# Patient Record
Sex: FEMALE | Race: BLACK OR AFRICAN AMERICAN | NOT HISPANIC OR LATINO | Employment: FULL TIME | URBAN - METROPOLITAN AREA
[De-identification: names, ages, dates, MRNs, and addresses within clinical notes are randomized per-mention and may not be internally consistent; named-entity substitution may affect disease eponyms.]

---

## 2018-07-27 ENCOUNTER — OFFICE VISIT (OUTPATIENT)
Dept: CARDIOLOGY | Facility: CLINIC | Age: 33
End: 2018-07-27
Payer: COMMERCIAL

## 2018-07-27 VITALS
SYSTOLIC BLOOD PRESSURE: 120 MMHG | WEIGHT: 283.06 LBS | HEART RATE: 77 BPM | HEIGHT: 67 IN | BODY MASS INDEX: 44.43 KG/M2 | DIASTOLIC BLOOD PRESSURE: 72 MMHG

## 2018-07-27 DIAGNOSIS — I10 HYPERTENSION, UNSPECIFIED TYPE: ICD-10-CM

## 2018-07-27 DIAGNOSIS — I87.2 VENOUS INSUFFICIENCY: ICD-10-CM

## 2018-07-27 DIAGNOSIS — I10 ESSENTIAL HYPERTENSION: ICD-10-CM

## 2018-07-27 PROCEDURE — 99203 OFFICE O/P NEW LOW 30 MIN: CPT | Mod: S$GLB,,, | Performed by: INTERNAL MEDICINE

## 2018-07-27 PROCEDURE — 99999 PR PBB SHADOW E&M-NEW PATIENT-LVL III: CPT | Mod: PBBFAC,,, | Performed by: INTERNAL MEDICINE

## 2018-07-27 PROCEDURE — 93000 ELECTROCARDIOGRAM COMPLETE: CPT | Mod: S$GLB,,, | Performed by: INTERNAL MEDICINE

## 2018-07-27 RX ORDER — NIFEDIPINE 60 MG/1
60 TABLET, EXTENDED RELEASE ORAL EVERY 12 HOURS
COMMUNITY
Start: 2018-06-20 | End: 2019-06-05

## 2018-07-27 RX ORDER — LABETALOL 200 MG/1
200 TABLET, FILM COATED ORAL 2 TIMES DAILY
COMMUNITY
Start: 2018-06-10 | End: 2024-01-11

## 2018-07-27 NOTE — PROGRESS NOTES
Subjective:   Patient ID:  Briseyda Juan  is a 33 y.o. female who presents for evaluation of Establish Care (echo results)      32 yo female. Referred for pulm edema.  PMH HTN.  Had first pregenency and delivery on 05-15  Developed pulm. Edema during the delivery. Had lasix and removed 20 pounds. Resolved  Now, mild CONTRERAS.  C/o lightheadedness and dizziness if standing up fast. No syncope. But faint last week. Occasional palpitation.  No orthopnea, PND, and chest pain.  C/o feet edema after delivery. Venous US ta BRG r/o DVT. Suggest venosu insuiffciciney  ECHO doen in  at [facility] normal EF, LVH  No smoking/drinking.  No f/h premature CAD.  Labetalol for a yr and Nifedipine started on 05/22.        Past Medical History:   Diagnosis Date    Hypertension        History reviewed. No pertinent surgical history.    Social History   Substance Use Topics    Smoking status: Never Smoker    Smokeless tobacco: Not on file    Alcohol use No       Family History   Problem Relation Age of Onset    Hypertension Mother        Review of Systems   Constitution: Negative for decreased appetite, diaphoresis, fever, weakness, malaise/fatigue and night sweats.   HENT: Negative for nosebleeds.    Eyes: Negative for blurred vision and double vision.   Cardiovascular: Negative for chest pain, claudication, dyspnea on exertion, irregular heartbeat, leg swelling, near-syncope, orthopnea, palpitations, paroxysmal nocturnal dyspnea and syncope.   Respiratory: Negative for cough, shortness of breath, sleep disturbances due to breathing, snoring, sputum production and wheezing.    Endocrine: Negative for cold intolerance and polyuria.   Hematologic/Lymphatic: Does not bruise/bleed easily.   Skin: Negative for rash.   Musculoskeletal: Negative for back pain, falls, joint pain, joint swelling and neck pain.   Gastrointestinal: Negative for abdominal pain, heartburn, nausea and vomiting.   Genitourinary: Negative for dysuria, frequency and  hematuria.   Neurological: Negative for difficulty with concentration, dizziness, focal weakness, headaches, light-headedness, numbness and seizures.   Psychiatric/Behavioral: Negative for depression, memory loss and substance abuse. The patient does not have insomnia.    Allergic/Immunologic: Negative for HIV exposure and hives.       Objective:   Physical Exam   Constitutional: She is oriented to person, place, and time. She appears well-nourished.   HENT:   Head: Normocephalic.   Eyes: Pupils are equal, round, and reactive to light.   Neck: Normal carotid pulses and no JVD present. Carotid bruit is not present. No thyromegaly present.   Cardiovascular: Normal rate, regular rhythm, normal heart sounds and normal pulses.   No extrasystoles are present. PMI is not displaced.  Exam reveals no gallop and no S3.    No murmur heard.  Pulmonary/Chest: Breath sounds normal. No stridor. No respiratory distress.   Abdominal: Soft. Bowel sounds are normal. There is no tenderness. There is no rebound.   Musculoskeletal: Normal range of motion. She exhibits edema.   1+   Neurological: She is alert and oriented to person, place, and time.   Skin: Skin is intact. No rash noted.   Psychiatric: Her behavior is normal.       No results found for: CHOL  No results found for: HDL  No results found for: LDLCALC  No results found for: TRIG  No results found for: CHOLHDL    Chemistry    No results found for: NA, K, CL, CO2, BUN, CREATININE, GLU No results found for: CALCIUM, ALKPHOS, AST, ALT, BILITOT, ESTGFRAFRICA, EGFRNONAA       No results found for: LABA1C, HGBA1C  No results found for: TSH  No results found for: INR, PROTIME  No results found for: WBC, HGB, HCT, MCV, PLT  BNP  @LABRCNTIP(BNP,BNPTRIAGEBLO)@  CrCl cannot be calculated (No order found.).  No results found in the last 24 hours.  No results found in the last 24 hours.  No results found in the last 24 hours.    Assessment:      1. Essential hypertension    2. Venous  insufficiency    3. HTN (hypertension)        Plan:   Can not stop Nifedipine due to breast feeding  Advise compression hose.  Dash  Continue Nifedipine and Labetalol  F/u with PCP

## 2019-06-05 ENCOUNTER — OFFICE VISIT (OUTPATIENT)
Dept: OTOLARYNGOLOGY | Facility: CLINIC | Age: 34
End: 2019-06-05
Payer: COMMERCIAL

## 2019-06-05 VITALS
WEIGHT: 291.88 LBS | TEMPERATURE: 98 F | SYSTOLIC BLOOD PRESSURE: 186 MMHG | BODY MASS INDEX: 45.72 KG/M2 | HEART RATE: 93 BPM | DIASTOLIC BLOOD PRESSURE: 121 MMHG

## 2019-06-05 DIAGNOSIS — R44.2 PHANTOSMIA: ICD-10-CM

## 2019-06-05 DIAGNOSIS — J32.9 CHRONIC SINUSITIS, UNSPECIFIED LOCATION: Primary | ICD-10-CM

## 2019-06-05 PROCEDURE — 3080F DIAST BP >= 90 MM HG: CPT | Mod: CPTII,S$GLB,, | Performed by: ORTHOPAEDIC SURGERY

## 2019-06-05 PROCEDURE — 3080F PR MOST RECENT DIASTOLIC BLOOD PRESSURE >= 90 MM HG: ICD-10-PCS | Mod: CPTII,S$GLB,, | Performed by: ORTHOPAEDIC SURGERY

## 2019-06-05 PROCEDURE — 99999 PR PBB SHADOW E&M-EST. PATIENT-LVL III: CPT | Mod: PBBFAC,,, | Performed by: ORTHOPAEDIC SURGERY

## 2019-06-05 PROCEDURE — 99204 OFFICE O/P NEW MOD 45 MIN: CPT | Mod: 25,S$GLB,, | Performed by: ORTHOPAEDIC SURGERY

## 2019-06-05 PROCEDURE — 3008F PR BODY MASS INDEX (BMI) DOCUMENTED: ICD-10-PCS | Mod: CPTII,S$GLB,, | Performed by: ORTHOPAEDIC SURGERY

## 2019-06-05 PROCEDURE — 99999 PR PBB SHADOW E&M-EST. PATIENT-LVL III: ICD-10-PCS | Mod: PBBFAC,,, | Performed by: ORTHOPAEDIC SURGERY

## 2019-06-05 PROCEDURE — 3077F SYST BP >= 140 MM HG: CPT | Mod: CPTII,S$GLB,, | Performed by: ORTHOPAEDIC SURGERY

## 2019-06-05 PROCEDURE — 3077F PR MOST RECENT SYSTOLIC BLOOD PRESSURE >= 140 MM HG: ICD-10-PCS | Mod: CPTII,S$GLB,, | Performed by: ORTHOPAEDIC SURGERY

## 2019-06-05 PROCEDURE — 31231 NASAL ENDOSCOPY DX: CPT | Mod: S$GLB,,, | Performed by: ORTHOPAEDIC SURGERY

## 2019-06-05 PROCEDURE — 3008F BODY MASS INDEX DOCD: CPT | Mod: CPTII,S$GLB,, | Performed by: ORTHOPAEDIC SURGERY

## 2019-06-05 PROCEDURE — 31231 PR NASAL ENDOSCOPY, DX: ICD-10-PCS | Mod: S$GLB,,, | Performed by: ORTHOPAEDIC SURGERY

## 2019-06-05 PROCEDURE — 99204 PR OFFICE/OUTPT VISIT, NEW, LEVL IV, 45-59 MIN: ICD-10-PCS | Mod: 25,S$GLB,, | Performed by: ORTHOPAEDIC SURGERY

## 2019-06-05 RX ORDER — SERTRALINE HYDROCHLORIDE 50 MG/1
50 TABLET, FILM COATED ORAL DAILY
Refills: 11 | COMMUNITY
Start: 2019-04-18

## 2019-06-05 NOTE — PROGRESS NOTES
Subjective:       Patient ID: Briseyda Juan  is a 33 y.o. female.    Chief Complaint: Sinusitis and Smelling & tasting smoke for the past week    Patient is a very pleasant 33 year old female here to see me today for the first time for evaluation of phantosmia.  She says that since Tuesday she has had severe phantosmia, and she is now smelling and tasting smoke.  There is no smoke in her home, and she does not smoke and there are no smokers in her home.  She is able to smell other things, but the smoke smell is strongest.  She has had a headache, but no other facial pain or pressure.  She has not noted any nasal congestion, and her breathing is at her baseline through her nose.  She has been on Flonase consistently for the last three days.   She has not had any recent dental work.  She denies any recent facial or nasal trauma.  She says that she is diagnosed with sinusitis every year from February to July.  She was last treated in April first with Cipro and then doxy as well as steroids.  She has no history of sinus surgery.    Review of Systems   Constitutional: Negative for chills, fatigue, fever and unexpected weight change.   HENT: Positive for congestion, postnasal drip and rhinorrhea. Negative for dental problem, ear discharge, ear pain, facial swelling, hearing loss, nosebleeds, sinus pressure, sneezing, sore throat, tinnitus, trouble swallowing and voice change.    Eyes: Negative for redness, itching and visual disturbance.   Respiratory: Negative for cough, choking, shortness of breath and wheezing.    Cardiovascular: Negative for chest pain and palpitations.   Gastrointestinal: Negative for abdominal pain.        No reflux.   Musculoskeletal: Negative for gait problem.   Skin: Negative for rash.   Neurological: Positive for light-headedness. Negative for dizziness, seizures and headaches.       Objective:      Physical Exam   Constitutional: She is oriented to person, place, and time. She appears  well-developed and well-nourished. No distress.   HENT:   Head: Normocephalic and atraumatic.   Right Ear: Tympanic membrane, external ear and ear canal normal.   Left Ear: Tympanic membrane, external ear and ear canal normal.   Nose: Nose normal. No mucosal edema, rhinorrhea, nasal deformity or septal deviation. No epistaxis. Right sinus exhibits no maxillary sinus tenderness and no frontal sinus tenderness. Left sinus exhibits no maxillary sinus tenderness and no frontal sinus tenderness.   Mouth/Throat: Uvula is midline, oropharynx is clear and moist and mucous membranes are normal. Mucous membranes are not pale and not dry. No dental caries. No oropharyngeal exudate or posterior oropharyngeal erythema.   Able to see middle turbinates bilaterally, no purulent drainage   Eyes: Pupils are equal, round, and reactive to light. Conjunctivae, EOM and lids are normal. Right eye exhibits no chemosis. Left eye exhibits no chemosis. Right conjunctiva is not injected. Left conjunctiva is not injected. No scleral icterus. Right eye exhibits normal extraocular motion and no nystagmus. Left eye exhibits normal extraocular motion and no nystagmus.   Neck: Trachea normal and phonation normal. No tracheal tenderness present. No tracheal deviation present. No thyroid mass and no thyromegaly present.   Cardiovascular: Intact distal pulses.   Pulmonary/Chest: Effort normal. No stridor. No respiratory distress.   Abdominal: She exhibits no distension.   Lymphadenopathy:        Head (right side): No submental, no submandibular, no preauricular, no posterior auricular and no occipital adenopathy present.        Head (left side): No submental, no submandibular, no preauricular, no posterior auricular and no occipital adenopathy present.     She has no cervical adenopathy.   Neurological: She is alert and oriented to person, place, and time. No cranial nerve deficit.   Skin: Skin is warm and dry. No rash noted. No erythema.    Psychiatric: She has a normal mood and affect. Her behavior is normal.         Patient: Briseyda THURMAN Drake  [id number], :1985  Procedure date:2019  Patient's medications, allergies, past medical, surgical, social and family histories were reviewed and updated as appropriate.  Chief Complaint:  Sinusitis and Smelling & tasting smoke for the past week    HPI:  Briseyda is a 33 y.o. female with the history of present illness as discussed in the clinic note from today.  Anterior rhinoscopy was insufficient to explain symptoms and findings.     Procedure: Risks, benefits, and alternatives of the procedure were discussed with the patient, and the patient consented to the nasal endoscopy.  The nasal cavity was sprayed with a topical decongestant and anesthetic (if needed). The endoscope was passed into each nostril and each nasal cavity was visualized.  On each side the nasal cavity, sinuses (if open), turbinates, middle and superior meatus, sphenoethmoidal recess and septum were examined with the findings described below. At the end of the examination, the scope was removed. The patient tolerated the procedure well with no complications.       Endoscopic Sinonasal Exam Findings:  -     The right side has mild edema  -     The left side has mild edema  -     Nasal secretions: No discolored secretions noted bilaterally  -     Nasal septum: no significant deviation or perforation appreciated   -     Inferior turbinate: hypertrophy or edema (Mild) and Normal mucosa without significant hypertrophy bilaterally  -     Middle turbinate: Normal mucosa without significant hypertrophy bilaterally  -     Other findings: No mass or obstructive lesion, specifically there was no mass or lesion of the superior recess that would be blocking the olfactory nerve    Assessment & Plan:  - see today's clinic note            Assessment:       1. Chronic sinusitis, unspecified location    2. Phantosmia        Plan:       1.  Chronic  sinusitis:  She has been diagnosed with sinusitis at least 3-4 times each year for the past several years.  Most recently, she has been treated with oral Cipro, doxycycline as well as steroids.  At this point I would recommend a CT scan of her sinuses and will call with results at that time.  2.  Phantosmia:  No sign of any mass or lesion on her examination today as the source of her phantosmia, sense of smelling smoke.  I would recommend CT scan as above to evaluate for any chronic sinus infection.  Will call with results.  In the interim, I would recommend a trial of daily Flonase as well as saline nasal spray.  I would also recommend an oral vitamin on a daily basis as there are some trace mental deficiencies that could also lead to disturbances in taste and smell.

## 2019-06-08 ENCOUNTER — HOSPITAL ENCOUNTER (OUTPATIENT)
Dept: RADIOLOGY | Facility: HOSPITAL | Age: 34
Discharge: HOME | End: 2019-06-08
Attending: ORTHOPAEDIC SURGERY
Payer: COMMERCIAL

## 2019-06-08 DIAGNOSIS — R44.2 PHANTOSMIA: ICD-10-CM

## 2019-06-08 DIAGNOSIS — J32.9 CHRONIC SINUSITIS, UNSPECIFIED LOCATION: ICD-10-CM

## 2019-06-08 PROCEDURE — 70486 CT MAXILLOFACIAL W/O DYE: CPT | Mod: TC

## 2019-06-10 RX ORDER — LEVOFLOXACIN 500 MG/1
500 TABLET, FILM COATED ORAL DAILY
Qty: 14 TABLET | Refills: 0 | Status: SHIPPED | OUTPATIENT
Start: 2019-06-10 | End: 2019-06-24

## 2019-07-23 ENCOUNTER — OFFICE VISIT (OUTPATIENT)
Dept: OTOLARYNGOLOGY | Facility: CLINIC | Age: 34
End: 2019-07-23
Payer: COMMERCIAL

## 2019-07-23 VITALS — WEIGHT: 293 LBS | HEIGHT: 67 IN | BODY MASS INDEX: 45.99 KG/M2 | TEMPERATURE: 97 F

## 2019-07-23 DIAGNOSIS — J32.2 CHRONIC ETHMOIDAL SINUSITIS: Primary | ICD-10-CM

## 2019-07-23 PROCEDURE — 99213 PR OFFICE/OUTPT VISIT, EST, LEVL III, 20-29 MIN: ICD-10-PCS | Mod: S$GLB,,, | Performed by: ORTHOPAEDIC SURGERY

## 2019-07-23 PROCEDURE — 99999 PR PBB SHADOW E&M-EST. PATIENT-LVL II: CPT | Mod: PBBFAC,,, | Performed by: ORTHOPAEDIC SURGERY

## 2019-07-23 PROCEDURE — 99999 PR PBB SHADOW E&M-EST. PATIENT-LVL II: ICD-10-PCS | Mod: PBBFAC,,, | Performed by: ORTHOPAEDIC SURGERY

## 2019-07-23 PROCEDURE — 99213 OFFICE O/P EST LOW 20 MIN: CPT | Mod: S$GLB,,, | Performed by: ORTHOPAEDIC SURGERY

## 2019-07-23 PROCEDURE — 3008F PR BODY MASS INDEX (BMI) DOCUMENTED: ICD-10-PCS | Mod: CPTII,S$GLB,, | Performed by: ORTHOPAEDIC SURGERY

## 2019-07-23 PROCEDURE — 3008F BODY MASS INDEX DOCD: CPT | Mod: CPTII,S$GLB,, | Performed by: ORTHOPAEDIC SURGERY

## 2019-07-23 NOTE — PROGRESS NOTES
Subjective:       Patient ID: Briseyda Juan  is a 34 y.o. female.    Chief Complaint: Follow-up    Patient is a very pleasant 33 year old female here to see me today in followup for phantosmia and sinusitis.  At her last visit a month ago she reported smelling and tasting smoke.  There is no smoke in her home, and she does not smoke and there are no smokers in her home.  She was able to smell other things, but the smoke smell is strongest.  She has had a headache, but no other facial pain or pressure.  She has not noted any nasal congestion, and her breathing is at her baseline through her nose.  She has been on Flonase consistently for the last three days.   She has not had any recent dental work.  She denies any recent facial or nasal trauma.  She says that she is diagnosed with sinusitis every year from February to July.  She was last treated in April first with Cipro and then doxy as well as steroids.  She has no history of sinus surgery.  I did obtain a CT scan after her last visit, which did show some mild-to-moderate ethmoidal sinusitis.  I then placed her on extended course of oral Augmentin.  She now reports that her symptoms have completely resolved, and she no longer has any phantosmia.    Review of Systems   Constitutional: Negative for chills, fatigue, fever and unexpected weight change.   HENT: Negative for congestion, dental problem, ear discharge, ear pain, facial swelling, hearing loss, nosebleeds, postnasal drip, rhinorrhea, sinus pressure, sneezing, sore throat, tinnitus, trouble swallowing and voice change.    Eyes: Negative for redness, itching and visual disturbance.   Respiratory: Negative for cough, choking, shortness of breath and wheezing.    Cardiovascular: Negative for chest pain and palpitations.   Gastrointestinal: Negative for abdominal pain.        No reflux.   Musculoskeletal: Negative for gait problem.   Skin: Negative for rash.   Neurological: Negative for dizziness,  light-headedness and headaches.       Objective:      Physical Exam   Constitutional: She is oriented to person, place, and time. She appears well-developed and well-nourished. No distress.   HENT:   Head: Normocephalic and atraumatic.   Right Ear: Tympanic membrane, external ear and ear canal normal.   Left Ear: Tympanic membrane, external ear and ear canal normal.   Nose: Nose normal. No mucosal edema, rhinorrhea, nasal deformity or septal deviation. No epistaxis. Right sinus exhibits no maxillary sinus tenderness and no frontal sinus tenderness. Left sinus exhibits no maxillary sinus tenderness and no frontal sinus tenderness.   Mouth/Throat: Uvula is midline, oropharynx is clear and moist and mucous membranes are normal. Mucous membranes are not pale and not dry. No dental caries. No oropharyngeal exudate or posterior oropharyngeal erythema.   Able to see middle turbinates bilaterally, no purulent drainage   Eyes: Pupils are equal, round, and reactive to light. Conjunctivae, EOM and lids are normal. Right eye exhibits no chemosis. Left eye exhibits no chemosis. Right conjunctiva is not injected. Left conjunctiva is not injected. No scleral icterus. Right eye exhibits normal extraocular motion and no nystagmus. Left eye exhibits normal extraocular motion and no nystagmus.   Neck: Trachea normal and phonation normal. No tracheal tenderness present. No tracheal deviation present. No thyroid mass and no thyromegaly present.   Cardiovascular: Intact distal pulses.   Pulmonary/Chest: Effort normal. No stridor. No respiratory distress.   Abdominal: She exhibits no distension.   Lymphadenopathy:        Head (right side): No submental, no submandibular, no preauricular, no posterior auricular and no occipital adenopathy present.        Head (left side): No submental, no submandibular, no preauricular, no posterior auricular and no occipital adenopathy present.     She has no cervical adenopathy.   Neurological: She is  alert and oriented to person, place, and time. No cranial nerve deficit.   Skin: Skin is warm and dry. No rash noted. No erythema.   Psychiatric: She has a normal mood and affect. Her behavior is normal.             CT SINUS:        Assessment:       1. Chronic ethmoidal sinusitis        Plan:       1.  Chronic ethmoidal sinusitis:  Symptoms completely resolved with oral antibiotics, no further intervention needed at this time.  I would recommend use of Flonase daily for the next several weeks, discussed that could help to prevent further episodes of sinusitis.   Return to clinic as needed.

## 2019-11-19 ENCOUNTER — OFFICE VISIT (OUTPATIENT)
Dept: OTOLARYNGOLOGY | Facility: CLINIC | Age: 34
End: 2019-11-19
Payer: COMMERCIAL

## 2019-11-19 VITALS — SYSTOLIC BLOOD PRESSURE: 183 MMHG | HEART RATE: 98 BPM | DIASTOLIC BLOOD PRESSURE: 117 MMHG | TEMPERATURE: 98 F

## 2019-11-19 DIAGNOSIS — J32.2 CHRONIC ETHMOIDAL SINUSITIS: Primary | ICD-10-CM

## 2019-11-19 DIAGNOSIS — R44.2 PHANTOSMIA: ICD-10-CM

## 2019-11-19 PROCEDURE — 99999 PR PBB SHADOW E&M-EST. PATIENT-LVL II: CPT | Mod: PBBFAC,,, | Performed by: ORTHOPAEDIC SURGERY

## 2019-11-19 PROCEDURE — 3077F PR MOST RECENT SYSTOLIC BLOOD PRESSURE >= 140 MM HG: ICD-10-PCS | Mod: CPTII,S$GLB,, | Performed by: ORTHOPAEDIC SURGERY

## 2019-11-19 PROCEDURE — 3080F DIAST BP >= 90 MM HG: CPT | Mod: CPTII,S$GLB,, | Performed by: ORTHOPAEDIC SURGERY

## 2019-11-19 PROCEDURE — 99999 PR PBB SHADOW E&M-EST. PATIENT-LVL II: ICD-10-PCS | Mod: PBBFAC,,, | Performed by: ORTHOPAEDIC SURGERY

## 2019-11-19 PROCEDURE — 3077F SYST BP >= 140 MM HG: CPT | Mod: CPTII,S$GLB,, | Performed by: ORTHOPAEDIC SURGERY

## 2019-11-19 PROCEDURE — 99214 PR OFFICE/OUTPT VISIT, EST, LEVL IV, 30-39 MIN: ICD-10-PCS | Mod: S$GLB,,, | Performed by: ORTHOPAEDIC SURGERY

## 2019-11-19 PROCEDURE — 3080F PR MOST RECENT DIASTOLIC BLOOD PRESSURE >= 90 MM HG: ICD-10-PCS | Mod: CPTII,S$GLB,, | Performed by: ORTHOPAEDIC SURGERY

## 2019-11-19 PROCEDURE — 99214 OFFICE O/P EST MOD 30 MIN: CPT | Mod: S$GLB,,, | Performed by: ORTHOPAEDIC SURGERY

## 2019-11-19 RX ORDER — LEVOFLOXACIN 500 MG/1
500 TABLET, FILM COATED ORAL DAILY
Qty: 14 TABLET | Refills: 0 | Status: SHIPPED | OUTPATIENT
Start: 2019-11-19 | End: 2019-12-03

## 2019-11-19 NOTE — PROGRESS NOTES
Subjective:       Patient ID: Briseyda Juan  is a 34 y.o. female.    Chief Complaint: Sinus Problem (smell of cigareete smoke)    Patient is a very pleasant 34 year old female here to see me today for evaluation of recurrent sinusitis.  She was last here about four months ago, and at that time had CT proven sinus disease (mild ethmoid and right maxillary).  She also had phantosmia with a smell of smoke.  She was on antibiotics, and her symptoms resolved until three weeks ago.  She is again having facial pain and pressure, pain over her right forehead as well as nasal congestion and drainage.  She has been on Flonase with no improvement in her symptoms.    Review of Systems   Constitutional: Negative for chills, fatigue, fever and unexpected weight change.   HENT: Negative for congestion, dental problem, ear discharge, ear pain, facial swelling, hearing loss, nosebleeds, postnasal drip, rhinorrhea, sinus pressure, sneezing, sore throat, tinnitus, trouble swallowing and voice change.    Eyes: Negative for redness, itching and visual disturbance.   Respiratory: Negative for cough, choking, shortness of breath and wheezing.    Cardiovascular: Negative for chest pain and palpitations.   Gastrointestinal: Negative for abdominal pain.        No reflux.   Musculoskeletal: Negative for gait problem.   Skin: Negative for rash.   Neurological: Positive for headaches. Negative for dizziness and light-headedness.       Objective:      Physical Exam   Constitutional: She is oriented to person, place, and time. She appears well-developed and well-nourished. No distress.   HENT:   Head: Normocephalic and atraumatic.   Right Ear: Tympanic membrane, external ear and ear canal normal.   Left Ear: Tympanic membrane, external ear and ear canal normal.   Nose: Nose normal. No mucosal edema, rhinorrhea, nasal deformity or septal deviation. No epistaxis. Right sinus exhibits no maxillary sinus tenderness and no frontal sinus tenderness.  Left sinus exhibits no maxillary sinus tenderness and no frontal sinus tenderness.   Mouth/Throat: Uvula is midline, oropharynx is clear and moist and mucous membranes are normal. Mucous membranes are not pale and not dry. No dental caries. No oropharyngeal exudate or posterior oropharyngeal erythema.   Middle turbinates medialized bilaterally, drainage seen on right   Eyes: Pupils are equal, round, and reactive to light. Conjunctivae, EOM and lids are normal. Right eye exhibits no chemosis. Left eye exhibits no chemosis. Right conjunctiva is not injected. Left conjunctiva is not injected. No scleral icterus. Right eye exhibits normal extraocular motion and no nystagmus. Left eye exhibits normal extraocular motion and no nystagmus.   Neck: Trachea normal and phonation normal. No tracheal tenderness present. No tracheal deviation present. No thyroid mass and no thyromegaly present.   Cardiovascular: Intact distal pulses.   Pulmonary/Chest: Effort normal. No stridor. No respiratory distress.   Abdominal: She exhibits no distension.   Lymphadenopathy:        Head (right side): No submental, no submandibular, no preauricular, no posterior auricular and no occipital adenopathy present.        Head (left side): No submental, no submandibular, no preauricular, no posterior auricular and no occipital adenopathy present.     She has no cervical adenopathy.   Neurological: She is alert and oriented to person, place, and time. No cranial nerve deficit.   Skin: Skin is warm and dry. No rash noted. No erythema.   Psychiatric: She has a normal mood and affect. Her behavior is normal.       Assessment:       1. Chronic ethmoidal sinusitis    2. Phantosmia        Plan:       1.  Chronic sinusitis:  Allergy to PCN, levaquin sent to pharmacy. Call if not improved in 3 days and will add short steroid burst (going out of town Monday).  Continue with flonase daily.  Add saline spray as well.  2.  Phantosmia:  Call if smell not resolved  with abx as above, discussed with patient that phantosmia is a sensitive indicator for sinusitis.

## 2020-08-10 ENCOUNTER — OFFICE VISIT (OUTPATIENT)
Dept: OTOLARYNGOLOGY | Facility: CLINIC | Age: 35
End: 2020-08-10
Payer: COMMERCIAL

## 2020-08-10 DIAGNOSIS — R44.2 PHANTOSMIA: ICD-10-CM

## 2020-08-10 DIAGNOSIS — J32.2 CHRONIC ETHMOIDAL SINUSITIS: Primary | ICD-10-CM

## 2020-08-10 PROCEDURE — 99214 PR OFFICE/OUTPT VISIT, EST, LEVL IV, 30-39 MIN: ICD-10-PCS | Mod: 95,,, | Performed by: ORTHOPAEDIC SURGERY

## 2020-08-10 PROCEDURE — 99214 OFFICE O/P EST MOD 30 MIN: CPT | Mod: 95,,, | Performed by: ORTHOPAEDIC SURGERY

## 2020-08-10 NOTE — PROGRESS NOTES
Subjective:      Patient ID: Briseyda Juan  is a 35 y.o. female.    Chief Complaint: No chief complaint on file.    Patient is a 35 year old female here to see me today for revaluation of recently increased sinus symptoms.  She has previously had a CT of the sinuses that did show significant sinusitis, and in the past has had issues sinusitis and her predominant symptom has been phantosmia with smelling smoke.  She denies nasal drainage, but has had some midfacial pressure.  She says that her symptoms started about three weeks ago, delivered baby boy about two weeks ago.  She is breast feeding.  She has not had any fevers, and denies SOB or coughing.        Review of Systems   Constitutional: Negative for chills, fatigue, fever and unexpected weight change.   HENT: Positive for congestion, postnasal drip, rhinorrhea and sinus pressure. Negative for dental problem, ear discharge, ear pain, facial swelling, hearing loss, nosebleeds, sneezing, sore throat, tinnitus, trouble swallowing and voice change.    Eyes: Negative for redness, itching and visual disturbance.   Respiratory: Negative for cough, choking, shortness of breath and wheezing.    Cardiovascular: Negative for chest pain and palpitations.   Gastrointestinal: Negative for abdominal pain.        No reflux.   Musculoskeletal: Negative for gait problem.   Skin: Negative for rash.   Neurological: Negative for dizziness, light-headedness and headaches.       Objective:       Physical Exam  Constitutional:       Appearance: She is well-developed. She is not diaphoretic.   HENT:      Head: Normocephalic and atraumatic.      Right Ear: No decreased hearing noted.      Left Ear: No decreased hearing noted.      Nose: Nose normal.   Eyes:      General: Lids are normal.      Conjunctiva/sclera: Conjunctivae normal.   Pulmonary:      Effort: Pulmonary effort is normal. No respiratory distress.   Skin:     Findings: No rash.   Neurological:      Mental Status: She is  alert.         Assessment:       1. Chronic ethmoidal sinusitis    2. Phantosmia        Plan:     Chronic ethmoidal sinusitis    Phantosmia    Will start with nedipot and Flonase bid.  Patient is breast feeding, if her symptoms progress or do not improve would then start on a course of Omnicef.  She has a PCN allergy in her chart, she says that her mother told her she was PCN allergic since she was an infant, does not know reaction.  I would also recommend she be seen by the allergist for PCN testing, as she does tend to have true sinusitis every 8-9 months and her antibiotic selection is complicated by her PCN allergy.  Discussed that she could continue to breast feed while taking Omnicef, though there is a risk of diarrhea and thrush in the infant.  She says that she has excess breast milk stored and would likely use that while taking the antibiotic.

## 2021-04-29 ENCOUNTER — PATIENT MESSAGE (OUTPATIENT)
Dept: RESEARCH | Facility: HOSPITAL | Age: 36
End: 2021-04-29

## 2021-11-09 ENCOUNTER — OFFICE VISIT (OUTPATIENT)
Dept: OTOLARYNGOLOGY | Facility: CLINIC | Age: 36
End: 2021-11-09
Payer: COMMERCIAL

## 2021-11-09 VITALS — BODY MASS INDEX: 45.99 KG/M2 | TEMPERATURE: 98 F | HEIGHT: 67 IN | WEIGHT: 293 LBS

## 2021-11-09 DIAGNOSIS — J30.89 NON-SEASONAL ALLERGIC RHINITIS, UNSPECIFIED TRIGGER: ICD-10-CM

## 2021-11-09 DIAGNOSIS — H65.91 MIDDLE EAR EFFUSION, RIGHT: ICD-10-CM

## 2021-11-09 DIAGNOSIS — J01.90 ACUTE NON-RECURRENT SINUSITIS, UNSPECIFIED LOCATION: Primary | ICD-10-CM

## 2021-11-09 PROCEDURE — 99214 OFFICE O/P EST MOD 30 MIN: CPT | Mod: S$GLB,,, | Performed by: ORTHOPAEDIC SURGERY

## 2021-11-09 PROCEDURE — 3008F PR BODY MASS INDEX (BMI) DOCUMENTED: ICD-10-PCS | Mod: CPTII,S$GLB,, | Performed by: ORTHOPAEDIC SURGERY

## 2021-11-09 PROCEDURE — 3008F BODY MASS INDEX DOCD: CPT | Mod: CPTII,S$GLB,, | Performed by: ORTHOPAEDIC SURGERY

## 2021-11-09 PROCEDURE — 1159F PR MEDICATION LIST DOCUMENTED IN MEDICAL RECORD: ICD-10-PCS | Mod: CPTII,S$GLB,, | Performed by: ORTHOPAEDIC SURGERY

## 2021-11-09 PROCEDURE — 99999 PR PBB SHADOW E&M-EST. PATIENT-LVL III: CPT | Mod: PBBFAC,,, | Performed by: ORTHOPAEDIC SURGERY

## 2021-11-09 PROCEDURE — 1159F MED LIST DOCD IN RCRD: CPT | Mod: CPTII,S$GLB,, | Performed by: ORTHOPAEDIC SURGERY

## 2021-11-09 PROCEDURE — 99999 PR PBB SHADOW E&M-EST. PATIENT-LVL III: ICD-10-PCS | Mod: PBBFAC,,, | Performed by: ORTHOPAEDIC SURGERY

## 2021-11-09 PROCEDURE — 99214 PR OFFICE/OUTPT VISIT, EST, LEVL IV, 30-39 MIN: ICD-10-PCS | Mod: S$GLB,,, | Performed by: ORTHOPAEDIC SURGERY

## 2021-11-09 RX ORDER — PREDNISONE 20 MG/1
TABLET ORAL
Qty: 21 TABLET | Refills: 0 | Status: SHIPPED | OUTPATIENT
Start: 2021-11-09 | End: 2024-01-11

## 2021-11-09 RX ORDER — CEFDINIR 300 MG/1
300 CAPSULE ORAL 2 TIMES DAILY
Qty: 20 CAPSULE | Refills: 0 | Status: SHIPPED | OUTPATIENT
Start: 2021-11-09 | End: 2021-11-19

## 2021-12-08 ENCOUNTER — TELEPHONE (OUTPATIENT)
Dept: OTOLARYNGOLOGY | Facility: CLINIC | Age: 36
End: 2021-12-08
Payer: COMMERCIAL

## 2024-01-11 ENCOUNTER — TELEPHONE (OUTPATIENT)
Dept: OTOLARYNGOLOGY | Facility: CLINIC | Age: 39
End: 2024-01-11
Payer: COMMERCIAL

## 2024-01-11 ENCOUNTER — OFFICE VISIT (OUTPATIENT)
Dept: OTOLARYNGOLOGY | Facility: CLINIC | Age: 39
End: 2024-01-11
Payer: COMMERCIAL

## 2024-01-11 VITALS — HEIGHT: 67 IN | BODY MASS INDEX: 45.99 KG/M2 | WEIGHT: 293 LBS

## 2024-01-11 DIAGNOSIS — J30.89 NON-SEASONAL ALLERGIC RHINITIS, UNSPECIFIED TRIGGER: ICD-10-CM

## 2024-01-11 DIAGNOSIS — J32.9 CHRONIC SINUSITIS, UNSPECIFIED LOCATION: Primary | ICD-10-CM

## 2024-01-11 DIAGNOSIS — Z88.0 PENICILLIN ALLERGY: ICD-10-CM

## 2024-01-11 PROCEDURE — 3008F BODY MASS INDEX DOCD: CPT | Mod: CPTII,S$GLB,, | Performed by: ORTHOPAEDIC SURGERY

## 2024-01-11 PROCEDURE — 1159F MED LIST DOCD IN RCRD: CPT | Mod: CPTII,S$GLB,, | Performed by: ORTHOPAEDIC SURGERY

## 2024-01-11 PROCEDURE — 99999 PR PBB SHADOW E&M-EST. PATIENT-LVL III: CPT | Mod: PBBFAC,,, | Performed by: ORTHOPAEDIC SURGERY

## 2024-01-11 PROCEDURE — 99214 OFFICE O/P EST MOD 30 MIN: CPT | Mod: S$GLB,,, | Performed by: ORTHOPAEDIC SURGERY

## 2024-01-11 NOTE — TELEPHONE ENCOUNTER
----- Message from Jennifer Hare sent at 1/11/2024 12:22 PM CST -----  Contact: [phone]  Calling requesting pt CT orders be faxed over to [facility]([id number]) [facility]#[id number] . Please call back at [phone] opt 2 then 3 . Thanksapoloniaj

## 2024-01-11 NOTE — PROGRESS NOTES
Subjective:      Patient ID: Briseyda Juan is a 38 y.o. female.    Chief Complaint: Other (Pt has come in due to recurrent strep throat . Pt states she has had 3 in the past 3 month )    Patient is a 38 year old female seen today for evaluation of recurrent strep throat.  She has young children at home.  She says that this started in November, she was first treated with oral doxycycline and then went to ED as her uvula was very swollen and she was given zithromax and a steroid injection.  She then returned the ED and was given oral clindamycin, and has now cleared.  She has had increased mucus since that time, as well as symptoms of sinusitis.  She has been sneezing and coughing, has been an issue since Thanksgiving.  She is on Flonase twice weekly. She was last here in 2021 with complaints of sinusitis and allergic rhinitis, last CT sinus 2019 and showed mild mucosal disease at that time.          Review of Systems   Constitutional: Negative.    HENT:  Positive for ear pain, sore throat and trouble swallowing.    Eyes: Negative.    Respiratory: Negative.     Cardiovascular: Negative.    Gastrointestinal: Negative.    Endocrine: Negative.    Genitourinary: Negative.    Musculoskeletal: Negative.    Skin: Negative.    Allergic/Immunologic: Positive for food allergies.   Neurological: Negative.    Hematological: Negative.    Psychiatric/Behavioral: Negative.         Objective:       Physical Exam  Constitutional:       General: She is not in acute distress.     Appearance: She is well-developed.   HENT:      Head: Normocephalic and atraumatic.      Right Ear: Tympanic membrane, ear canal and external ear normal.      Left Ear: Tympanic membrane, ear canal and external ear normal.      Nose: Mucosal edema and rhinorrhea present. No nasal deformity or septal deviation.      Right Sinus: No maxillary sinus tenderness or frontal sinus tenderness.      Left Sinus: No maxillary sinus tenderness or frontal sinus tenderness.       Comments: Very congested, hyponasal voice     Mouth/Throat:      Mouth: Mucous membranes are not pale and not dry.      Dentition: No dental caries.      Pharynx: Uvula midline. No oropharyngeal exudate or posterior oropharyngeal erythema.      Tonsils: 2+ on the right. 2+ on the left.      Comments: No exudate on tonsils  Eyes:      General: Lids are normal. No scleral icterus.     Extraocular Movements:      Right eye: Normal extraocular motion and no nystagmus.      Left eye: Normal extraocular motion and no nystagmus.      Conjunctiva/sclera: Conjunctivae normal.      Right eye: Right conjunctiva is not injected. No chemosis.     Left eye: Left conjunctiva is not injected. No chemosis.     Pupils: Pupils are equal, round, and reactive to light.   Neck:      Thyroid: No thyroid mass or thyromegaly.      Trachea: Trachea and phonation normal. No tracheal tenderness or tracheal deviation.   Pulmonary:      Effort: Pulmonary effort is normal. No respiratory distress.      Breath sounds: No stridor.   Abdominal:      General: There is no distension.   Lymphadenopathy:      Head:      Right side of head: No submental, submandibular, preauricular, posterior auricular or occipital adenopathy.      Left side of head: No submental, submandibular, preauricular, posterior auricular or occipital adenopathy.      Cervical: No cervical adenopathy.   Skin:     General: Skin is warm and dry.      Findings: No erythema or rash.   Neurological:      Mental Status: She is alert and oriented to person, place, and time.      Cranial Nerves: No cranial nerve deficit.   Psychiatric:         Behavior: Behavior normal.         Assessment:       1. Chronic sinusitis, unspecified location    2. Non-seasonal allergic rhinitis, unspecified trigger    3. Penicillin allergy        Plan:     Chronic sinusitis, unspecified location  -     CT [email or web address] Sinuses without; Future; Expected date: 01/11/2024    Non-seasonal allergic rhinitis,  unspecified trigger    Penicillin allergy  -     Ambulatory referral/consult to Allergy; Future; Expected date: 01/18/2024    At this point, the patient has been on multiple rounds of antibiotics including Zithromax and doxycycline, clindamycin as well as steroids x 2  without significant or sustained improvement in their symptoms.  I would recommend a CT of the sinuses for further evaluation.  If the scan shows persistent sinus disease, she will then need a longer course of antibiotics, likely three to four weeks of Omnicef (PCN allergy, but has had Omnicef).  If the scan does not show persistent infection, the patient's symptoms are likely due to underlying allergies and would then maximize allergy therapy, possibly considering skin testing if needed.  The patient understands this treatment plan, and will call with results when available.

## 2024-01-11 NOTE — Clinical Note
"Do you do PCN allergy testing?  This is a chronic sinusitis pt who has PCN allergy on her chart "because her mom said she was allergic."  She is interested in doing the testing to have it removed if it is not real- thanks!"